# Patient Record
Sex: MALE | Race: WHITE | NOT HISPANIC OR LATINO | Employment: UNEMPLOYED | URBAN - METROPOLITAN AREA
[De-identification: names, ages, dates, MRNs, and addresses within clinical notes are randomized per-mention and may not be internally consistent; named-entity substitution may affect disease eponyms.]

---

## 2023-05-29 PROBLEM — R79.89 ELEVATED LFTS: Status: ACTIVE | Noted: 2023-05-29

## 2023-05-29 PROBLEM — F15.10 METHAMPHETAMINE ABUSE: Status: ACTIVE | Noted: 2023-05-29

## 2023-05-29 PROBLEM — L02.413 ABSCESS OF ARM, RIGHT: Status: ACTIVE | Noted: 2023-05-29

## 2023-05-29 PROBLEM — F19.90 IV DRUG USER: Status: ACTIVE | Noted: 2023-05-29

## 2023-05-29 PROBLEM — E87.6 HYPOKALEMIA: Status: ACTIVE | Noted: 2023-05-29

## 2023-05-29 PROBLEM — E87.1 HYPONATREMIA: Status: ACTIVE | Noted: 2023-05-29

## 2023-06-01 PROBLEM — E87.6 HYPOKALEMIA: Status: RESOLVED | Noted: 2023-05-29 | Resolved: 2023-06-01

## 2023-06-05 PROBLEM — R74.01 TRANSAMINITIS: Status: ACTIVE | Noted: 2023-06-05

## 2023-06-05 PROBLEM — R19.5 LOOSE STOOLS: Status: ACTIVE | Noted: 2023-06-05

## 2023-06-06 PROBLEM — E87.1 HYPONATREMIA: Status: RESOLVED | Noted: 2023-05-29 | Resolved: 2023-06-06

## 2023-06-06 PROBLEM — R74.01 TRANSAMINITIS: Status: RESOLVED | Noted: 2023-06-05 | Resolved: 2023-06-06

## 2023-06-06 PROBLEM — R19.5 LOOSE STOOLS: Status: RESOLVED | Noted: 2023-06-05 | Resolved: 2023-06-06

## 2023-06-08 ENCOUNTER — PATIENT OUTREACH (OUTPATIENT)
Dept: ADMINISTRATIVE | Facility: CLINIC | Age: 33
End: 2023-06-08

## 2023-06-08 NOTE — PROGRESS NOTES
C3 nurse attempted to contact Devyn Webber for a TCC post hospital discharge follow up call. No answer. No voicemail options. The patient does not have a scheduled HOSFU appointment, and the pt does not have an [facility] PCP.